# Patient Record
Sex: FEMALE | Race: BLACK OR AFRICAN AMERICAN | NOT HISPANIC OR LATINO | Employment: FULL TIME | URBAN - METROPOLITAN AREA
[De-identification: names, ages, dates, MRNs, and addresses within clinical notes are randomized per-mention and may not be internally consistent; named-entity substitution may affect disease eponyms.]

---

## 2023-08-22 ENCOUNTER — VIRTUAL VISIT (OUTPATIENT)
Dept: ENDOCRINOLOGY | Facility: CLINIC | Age: 33
End: 2023-08-22
Payer: MEDICAID

## 2023-08-22 VITALS — WEIGHT: 248 LBS | BODY MASS INDEX: 36.73 KG/M2 | HEIGHT: 69 IN

## 2023-08-22 DIAGNOSIS — E66.811 CLASS 1 OBESITY WITHOUT SERIOUS COMORBIDITY WITH BODY MASS INDEX (BMI) OF 31.0 TO 31.9 IN ADULT, UNSPECIFIED OBESITY TYPE: Primary | ICD-10-CM

## 2023-08-22 PROCEDURE — 99203 OFFICE O/P NEW LOW 30 MIN: CPT | Mod: VID | Performed by: INTERNAL MEDICINE

## 2023-08-22 ASSESSMENT — SLEEP AND FATIGUE QUESTIONNAIRES
HOW LIKELY ARE YOU TO NOD OFF OR FALL ASLEEP IN A CAR, WHILE STOPPED FOR A FEW MINUTES IN TRAFFIC: MODERATE CHANCE OF DOZING
HOW LIKELY ARE YOU TO NOD OFF OR FALL ASLEEP WHILE WATCHING TV: WOULD NEVER DOZE
HOW LIKELY ARE YOU TO NOD OFF OR FALL ASLEEP WHILE SITTING INACTIVE IN A PUBLIC PLACE: MODERATE CHANCE OF DOZING
HOW LIKELY ARE YOU TO NOD OFF OR FALL ASLEEP WHILE LYING DOWN TO REST IN THE AFTERNOON WHEN CIRCUMSTANCES PERMIT: MODERATE CHANCE OF DOZING
HOW LIKELY ARE YOU TO NOD OFF OR FALL ASLEEP WHILE SITTING AND READING: MODERATE CHANCE OF DOZING
HOW LIKELY ARE YOU TO NOD OFF OR FALL ASLEEP WHILE SITTING AND TALKING TO SOMEONE: MODERATE CHANCE OF DOZING
HOW LIKELY ARE YOU TO NOD OFF OR FALL ASLEEP WHILE SITTING QUIETLY AFTER LUNCH WITHOUT ALCOHOL: MODERATE CHANCE OF DOZING
HOW LIKELY ARE YOU TO NOD OFF OR FALL ASLEEP WHEN YOU ARE A PASSENGER IN A CAR FOR AN HOUR WITHOUT A BREAK: MODERATE CHANCE OF DOZING

## 2023-08-22 ASSESSMENT — PAIN SCALES - GENERAL: PAINLEVEL: MILD PAIN (3)

## 2023-08-22 NOTE — PATIENT INSTRUCTIONS
Start Wegovy 0.25 mg weekly x4 weeks;  increase to 0.5 mg weekly x4 weeks;  increase to 1.0 mg weekly for 4 weeks;  increase to 1.7 mg weekly for 4 weeks;  increase to 2.4 mg weekly thereafter    See Lauren Bloch, PharmD in 2 month(s)  See Dr.Tasma BOSWELL in 4 month(s)    If you have any questions, please do not hesitate to call Weight management clinic at [phone] or [phone].  If you need to fax, please fax to [phone].    Sincerely,    Jacquelyn Evans MD  Endocrinology

## 2023-08-22 NOTE — LETTER
"2023       RE: Diana Nelson  [address]  [facility] [address]     Dear Colleague,    Thank you for referring your patient, Diana Nelson, to the [facility] WEIGHT MANAGEMENT CLINIC [place] at [facility]. Please see a copy of my visit note below.        New Medical Weight Management Consult    PATIENT:  Diana Nelson  MRN:         [id number]  :         1990  OLVIN:         2023    Dear PCP,    I had the pleasure of seeing your patient, Diana Nelson. Full intake/assessment was done to determine barriers to weight loss success and develop a treatment plan. Diana Nelson is a 32 year old female interested in treatment of medical problems associated with excess weight. She has a height of 5' 9\", a weight of 248 lbs 0 oz, and the calculated Body mass index is 36.62 kg/m .    She has the following co-morbidities: depression and anxiety, left ankle pain    Weight history: hard time losing weight over the past several years although she reports eating healthy and stays active.   Was on Depot-Provera -- did not gain weight with it.    In 2018 - lost down 200 lbs -- with diet and exercise    Attempts: different diets but did not sustain weight loss    Eating habit: skip meals sometimes, cook and eat healthy, snack on some TrailMix.  Some traditional food or pasta. No fast food.   No pop, usually drinks ice coffee    Sleep: 6-7 hours per night  Exercise: regular exercise 3-4 times per week, strength training and cardio  Job: office job    She is not currently on Adderall.         2023     6:55 PM   --   I have the following health issues associated with obesity None of the above   I have the following symptoms associated with obesity Knee Pain    Depression    Lower Extremity Swelling    Back Pain    Groin Rash    Hip Pain    Irregular Menstral Cycle         2023     6:55 PM   Referring Provider   Please name the provider who referred " "you to Medical Weight Management  If you do not know, please answer \"I Don't Know\" I dont lnow           8/21/2023     6:55 PM   Weight History   How concerned are you about your weight? Very Concerned   I became overweight As a Child   The following factors have contributed to my weight gain Eating Wrong Types of Food    Eating Too Much    Stress   I have tried the following methods to lose weight Watching Portions or Calories    Exercise    Meal Replacements    Fasting   My lowest weight since age 19 was 200   My highest weight since age 19 was 260   The most weight I have ever lost was (lbs) 25   I have the following family history of obesity/being overweight I am the only one in my immediate family who is overweight   How has your weight changed over the last year? Gained   How many pounds? 30+           8/21/2023     6:55 PM   Diet Recall Review with Patient   If you do eat breakfast, what types of food do you eat? Oatmeal, eggs   If you do eat supper, what types of food do you typically eat? [place], veggies, pasta   If you do snack, what types of food do you typically eat? Trailmix   How many glasses of juice do you drink in a typical day? 1   How many of glasses of milk do you drink in a typical day? 0   How many 8oz glasses of sugar containing drinks such as Carlos-Aid/sweet tea do you drink in a day? 0   How many cans/bottles of sugar pop/soda/tea/sports drinks do you drink in a day? 0   How many cans/bottles of diet pop/soda/tea or sports drink do you drink in a day? 0   How often do you have a drink of alcohol? Never           8/21/2023     6:55 PM   Eating Habits   Generally, my meals include foods like these bread, pasta, rice, potatoes, corn, crackers, sweet dessert, pop, or juice Less Than Weekly   Generally, my meals include foods like these fried meats, brats, burgers, french fries, pizza, cheese, chips, or ice cream Never   Eat fast food (like McDonalds, Burger Shola, Taco Bell) Never   Eat at a " buffet or sit-down restaurant Never   Eat most of my meals in front of the TV or computer Never   Often skip meals, eat at random times, have no regular eating times Almost Everyday   Rarely sit down for a meal but snack or graze throughout A Few Times a Week   Eat extra snacks between meals Once a Week   Eat most of my food at the end of the day Once a Week   Eat in the middle of the night or wake up at night to eat Once a Week   Eat extra snacks to prevent or correct low blood sugar Never   Eat to prevent acid reflux or stomach pain Never   Worry about not having enough food to eat Never   I eat when I am depressed Never   I eat when I am stressed Less Than Weekly   I eat when I am bored Less Than Weekly   I eat when I am anxious Never   I eat when I am happy or as a reward Never   I feel hungry all the time even if I just have eaten Never   Feeling full is important to me Never   I finish all the food on my plate even if I am already full Never   I can't resist eating delicious food or walk past the good food/smell Never   I eat/snack without noticing that I am eating Less Than Weekly   I eat when I am preparing the meal Never   I eat more than usual when I see others eating Less Than Weekly   I have trouble not eating sweets, ice cream, cookies, or chips if they are around the house Never   I think about food all day Once a Week   What foods, if any, do you crave? None   Please list any other foods you crave?  Food           8/21/2023     6:55 PM   Amount of Food   I feel out of control when eating Never   I eat a large amount of food, like a loaf of bread, a box of cookies, a pint/quart of ice cream, all at once Never   I eat a large amount of food even when I am not hungry Never   I eat rapidly Never   I eat alone because I feel embarrassed and do not want others to see how much I have eaten Never   I eat until I am uncomfortably full Never   I feel bad, disgusted, or guilty after I overeat Never            8/21/2023     6:55 PM   Activity/Exercise History   How much of a typical 12 hour day do you spend sitting? Half the Day   How much of a typical 12 hour day do you spend lying down? Less Than Half the Day   How much of a typical day do you spend walking/standing? Less Than Half the Day   How many hours (not including work) do you spend on the TV/Video Games/Computer/Tablet/Phone? 1 Hour or Less   How many times a week are you active for the purpose of exercise? 2-3 Times a Week   What keeps you from being more active? Other   How many total minutes do you spend doing some activity for the purpose of exercising when you exercise? More Than 30 Minutes       PAST MEDICAL HISTORY:  Past Medical History:   Diagnosis Date    ADD (attention deficit disorder)     Depressive disorder     NO ACTIVE PROBLEMS            8/21/2023     6:55 PM   Work/Social History Reviewed With Patient   My employment status is Full-Time   My job is Admin   How much of your job is spent on the computer or phone? 75%   How many hours do you spend commuting to work daily? 15 -20Mins   What is your marital status? Single   Who do you live with? Family   Who does the food shopping? Me or my sister           8/21/2023     6:55 PM   Mental Health History Reviewed With Patient   Have you ever been physically or sexually abused? No   How often in the past 2 weeks have you felt little interest or pleasure in doing things? Not at all   Over the past 2 weeks how often have you felt down, depressed, or hopeless? Not at all           8/21/2023     6:55 PM   Sleep History Reviewed With Patient   How many hours do you sleep at night? 7       MEDICATIONS:   Current Outpatient Medications   Medication Sig Dispense Refill    Atomoxetine HCl (STRATTERA PO)       HYDROcodone-acetaminophen (NORCO) 5-325 MG per tablet Take 2 tablets by mouth every 4 hours as needed for moderate to severe pain 10 tablet 0    ondansetron (ZOFRAN ODT) 4 MG disintegrating  "tablet Take 1 tablet (4 mg) by mouth every 6 hours as needed for nausea 15 tablet 0    VENLAFAXINE HCL PO Take 75 mg by mouth         ALLERGIES:   Allergies   Allergen Reactions    Shellfish Allergy        PHYSICAL EXAM:  Ht 1.753 m (5' 9\")   Wt 112.5 kg (248 lb)   BMI 36.62 kg/m      Waist circumference:      Wt Readings from Last 4 Encounters:   08/22/23 112.5 kg (248 lb)   05/14/16 86.2 kg (190 lb)   07/16/14 94.3 kg (208 lb)   08/26/05 74.8 kg (165 lb) (96 %, Z= 1.80)*     * Growth percentiles are based on CDC (Girls, 2-20 Years) data.     A & O x 3  HEENT: NCAT, mucous membranes moist  Respirations unlabored  Location of obesity: Mixed Obesity      ASSESSMENT/PLAN:  Diana is a patient with mature onset obesity without significant element of familial/genetic influence and without current health consequences. She does not need aggressive weight loss plan.  Diana Nelson has a disorganized meal pattern.    Her problem is complicated by a hunger disorder and strong craving/reward pathways    Her ability to lose weight is impacted by current work life and lack of confidence.    PLAN:    Decrease eating out  Purge house of food triggers  No meal skipping  Calorie/low fat diet  Meal planning    Programmatic/Healthy Living  Ancillary testing:  N/A.  Food Plan:  N/A.  Activity Plan:  Exercise after meals.  Supplementary:   N/A.    Medication:  The patient will begin medication in pursuit of improved medical status as influenced by body weight. She will start Wegovy 0.25 mg weekly x4 weeks;  increase to 0.5 mg weekly x4 weeks;  increase to 1.0 mg weekly for 4 weeks;  increase to 1.7 mg weekly for 4 weeks;  increase to 2.4 mg weekly thereafter    There is a mutual understanding of the goals and risks of this therapy. The patient is in agreement. She is educated on dosage regimen and possible side effects.    Refer nutritionist    FOLLOW-UP:   See Lauren Bloch, PharmD in 2 month(s)  See Dr.Tasma BOSWELL in 4 " month(s)    Joined the call at 8/22/2023, 9:18:53 am.  Left the call at 8/22/2023, 9:34:30 am.  You were on the call for 15 minutes 36 seconds .    External notes/medical records independently reviewed, labs and imaging independently reviewed, medical management and tests to be discussed/communicated to patient.    Time: I spent 32 minutes spent on the date of the encounter preparing to see patient (including chart review and preparation), obtaining and or reviewing additional medical history, performing a physical exam and evaluation, documenting clinical information in the electronic health record, independently interpreting results, communicating results to the patient and coordinating care.    Sincerely,    Jacquelyn Evans MD

## 2023-08-22 NOTE — PROGRESS NOTES
Virtual Visit Details    Type of service:  Video Visit       Originating Location (pt. Location): Home    Distant Location (provider location):  Off-site  Platform used for Video Visit: Danilo

## 2023-08-22 NOTE — NURSING NOTE
Is the patient currently in the state of [place]? YES    Visit mode:VIDEO    If the visit is dropped, the patient can be reconnected by: VIDEO VISIT: Text to cell phone:   Telephone Information:   Mobile [phone]       Will anyone else be joining the visit? NO  (If patient encounters technical issues they should call [phone])    How would you like to obtain your AVS? MyChart    Are changes needed to the allergy or medication list? Pt declined allergy review and Pt stated no med changes    Reason for visit: Consult    Nanci WALTON

## 2023-08-23 ENCOUNTER — TELEPHONE (OUTPATIENT)
Dept: ENDOCRINOLOGY | Facility: CLINIC | Age: 33
End: 2023-08-23

## 2023-08-23 DIAGNOSIS — E66.811 CLASS 1 OBESITY WITHOUT SERIOUS COMORBIDITY WITH BODY MASS INDEX (BMI) OF 31.0 TO 31.9 IN ADULT, UNSPECIFIED OBESITY TYPE: Primary | ICD-10-CM

## 2023-08-23 RX ORDER — SEMAGLUTIDE 0.25 MG/.5ML
0.25 INJECTION, SOLUTION SUBCUTANEOUS WEEKLY
Qty: 2 ML | Refills: 0 | Status: SHIPPED | OUTPATIENT
Start: 2023-08-23

## 2023-08-23 NOTE — TELEPHONE ENCOUNTER
Left VM 8/23    Dr. Valladares sent me 2 patients that she would like to see an RD sooner than they have their appointments scheduled.This is a 30 minute spot for new M nutrition. Please assist.

## 2023-08-30 ENCOUNTER — TELEPHONE (OUTPATIENT)
Dept: ENDOCRINOLOGY | Facility: CLINIC | Age: 33
End: 2023-08-30
Payer: MEDICAID

## 2023-08-30 NOTE — TELEPHONE ENCOUNTER
BRIAN and sent mychart regarding the following:    Follow-up with Dr Valladares around 12/22/23  New appointment with Lauren Bloch around 10/22/23

## 2023-08-30 NOTE — LETTER
"2023    To: [place] Medicaid    RE: Diana Nelson  [address]  [place] [facility] [address]  : 1990  ID#: [id number]    MRN: [id number]    To Whom It May Concern,    I am writing on behalf of my patient, Diana Nelson to document the medical necessity of Wegovy for the treatment of obesity. This letter provides information about the patient's medical history and diagnosis and a statement summarizing my treatment rationale.     Summary of Patient History and Diagnosis  I had the pleasure of seeing your patient, Diana Nelson. Full intake/assessment was done to determine barriers to weight loss success and develop a treatment plan. Diana Nelson is a 32 year old female interested in treatment of medical problems associated with excess weight. She has a height of 5' 9\", a weight of 248 lbs 0 oz, and the calculated Body mass index is 36.62 kg/m .     She has the following co-morbidities: depression and anxiety, left ankle pain.       Treatment Rationale  Denial Rational:       Patient does not have any contraindications to Wegovy, including: pregnancy, no personal or family history of medullary thyroid carcinoma, no personal or family history of MENS2, not currently on any other GLP1 receptor agonist. Patient just established care with our comprehensive weight management clinic. She will be working on lifestyle modifications, including reduced calorie diet and increased physical activity/exercise. She is scheduled to meet one of our registered dietitians next month.       Duration  Up to 12 months.       Summary  In summary, Wegovy is medically necessary for this patient s medical condition. Please call my office at [phone] if I can provide you with any additional information to approve my request. I look forward to receiving your timely response and approval of this request.     Sincerely,    Jacquelyn Evans MD  "

## 2023-08-30 NOTE — TELEPHONE ENCOUNTER
Thank you,  Reynaldo Herrera, [facility]  [facility]  @~~~~~~

## 2023-09-01 NOTE — TELEPHONE ENCOUNTER
PA Initiation    Medication: WEGOVY 0.25 MG/0.5ML SC SOAJ  Insurance Company: [place] Medicaid ([facility]) - Phone [phone] Fax [phone]  Pharmacy Filling the Rx: [facility] - [address]  Filling Pharmacy Phone: [phone]  Filling Pharmacy Fax: [phone]  Start Date: 8/31/2023

## 2023-09-01 NOTE — TELEPHONE ENCOUNTER
PRIOR AUTHORIZATION DENIED    Medication: WEGOVY 0.25 MG/0.5ML SC SOAJ    Insurance Company: [place] Medicaid ([facility]) - Phone [phone] Fax [phone]    Denial Date: 9/1/2023    Denial Rational:       Appeal Information:

## 2023-09-05 NOTE — TELEPHONE ENCOUNTER
Medication Appeal Request    Please initiate an appeal for the requested medication: WEGOVY 0.25 MG/0.5ML SC SOAJ    Has a letter of medical necessity been completed in EPIC?   yes    Any additional lab values/information to include?     Would you like to include any research articles?               If yes please include the hyperlink(s) below or fax to    [phone] for Specialty/Retail               [phone] for Infusion/Clinic Administered.                Include the patients name and MRN on the fax cover sheet.

## 2023-09-05 NOTE — TELEPHONE ENCOUNTER
Medication Appeal Initiation    We have initiated an appeal for the requested medication:  Medication: WEGOVY 0.25 MG/0.5ML SC SOAJ  Appeal Start Date:  9/5/2023  Insurance Company: [place] Medicaid / [employer]  Insurance Phone: [phone]  Insurance Fax: [phone]  Comments:

## 2023-09-06 NOTE — TELEPHONE ENCOUNTER
MEDICATION APPEAL APPROVED    Medication: WEGOVY 0.25 MG/0.5ML SC SOAJ  Authorization Effective Date: 9/5/2023  Authorization Expiration Date: 9/30/2023  Approved Dose/Quantity: 4 pens per 28 days  Reference #:     Appeal Insurance Company: [place] Medicaid / [employer]  Expected CoPay:       CoPay Card Available:    Financial Assistance Needed:   Which Pharmacy is filling the prescription: [facility] [place] - [address]  Patient Notified: Yes    Only approved for one month. Will need to re-do in October once new insurance is available.

## 2023-09-18 ENCOUNTER — APPOINTMENT (OUTPATIENT)
Dept: GENERAL RADIOLOGY | Facility: CLINIC | Age: 33
End: 2023-09-18
Attending: EMERGENCY MEDICINE
Payer: MEDICAID

## 2023-09-18 ENCOUNTER — HOSPITAL ENCOUNTER (EMERGENCY)
Facility: CLINIC | Age: 33
Discharge: HOME | End: 2023-09-18
Admitting: EMERGENCY MEDICINE
Payer: MEDICAID

## 2023-09-18 VITALS
OXYGEN SATURATION: 100 % | HEART RATE: 77 BPM | DIASTOLIC BLOOD PRESSURE: 91 MMHG | SYSTOLIC BLOOD PRESSURE: 130 MMHG | RESPIRATION RATE: 18 BRPM | TEMPERATURE: 98 F

## 2023-09-18 PROCEDURE — 99283 EMERGENCY DEPT VISIT LOW MDM: CPT

## 2023-09-18 PROCEDURE — 99281 EMR DPT VST MAYX REQ PHY/QHP: CPT

## 2023-09-18 PROCEDURE — 73610 X-RAY EXAM OF ANKLE: CPT | Mod: LT

## 2023-09-19 NOTE — ED TRIAGE NOTES
Pt comes in for L ankle pain. Pt had fall yesterday after tripping on carpet. No swelling seen. Pt is limping

## 2023-10-02 ENCOUNTER — TELEPHONE (OUTPATIENT)
Dept: ENDOCRINOLOGY | Facility: CLINIC | Age: 33
End: 2023-10-02

## 2023-10-31 ENCOUNTER — TELEPHONE (OUTPATIENT)
Dept: ENDOCRINOLOGY | Facility: CLINIC | Age: 33
End: 2023-10-31

## 2023-10-31 DIAGNOSIS — E66.811 CLASS 1 OBESITY WITHOUT SERIOUS COMORBIDITY WITH BODY MASS INDEX (BMI) OF 31.0 TO 31.9 IN ADULT, UNSPECIFIED OBESITY TYPE: Primary | ICD-10-CM

## 2023-10-31 NOTE — TELEPHONE ENCOUNTER
Talked with patient and she states her 10/31 appointment with Danita Baron should have been cancelled about 1 week ago (per discussions via mychart).    She has yet to start any medication (due to backorder) and will call to schedule with dietitian in future.    Her insurance is changing as of tomorrow and she will call to update.

## 2023-10-31 NOTE — TELEPHONE ENCOUNTER
Patient needs to be rescheduled for their virtual visit due to Reason for Reschedule: Patient Request    Appointment mode: Video  Provider: Danita Baron

## 2023-11-02 DIAGNOSIS — E66.811 CLASS 1 OBESITY WITHOUT SERIOUS COMORBIDITY WITH BODY MASS INDEX (BMI) OF 31.0 TO 31.9 IN ADULT, UNSPECIFIED OBESITY TYPE: Primary | ICD-10-CM

## 2023-11-02 NOTE — TELEPHONE ENCOUNTER
Pt is requesting new rx for    Pen needles 1gx 6mm- use with saxenda    Did not see on active med list please verify and send new rx    [facility] spec/mail pharmacy  [phone]

## 2023-11-17 DIAGNOSIS — E66.811 CLASS 1 OBESITY WITHOUT SERIOUS COMORBIDITY WITH BODY MASS INDEX (BMI) OF 31.0 TO 31.9 IN ADULT, UNSPECIFIED OBESITY TYPE: Primary | ICD-10-CM

## 2023-11-17 RX ORDER — PHENTERMINE HYDROCHLORIDE 15 MG/1
15 CAPSULE ORAL EVERY MORNING
Qty: 30 CAPSULE | Refills: 2 | Status: SHIPPED | OUTPATIENT
Start: 2023-11-17

## 2023-11-30 ENCOUNTER — HOSPITAL ENCOUNTER (EMERGENCY)
Facility: CLINIC | Age: 33
Discharge: HOME | End: 2023-11-30
Admitting: EMERGENCY MEDICINE

## 2023-11-30 VITALS
HEART RATE: 65 BPM | SYSTOLIC BLOOD PRESSURE: 148 MMHG | RESPIRATION RATE: 16 BRPM | TEMPERATURE: 98.3 F | OXYGEN SATURATION: 98 % | DIASTOLIC BLOOD PRESSURE: 76 MMHG

## 2023-11-30 LAB
FLUAV RNA SPEC QL NAA+PROBE: NEGATIVE
FLUBV RNA RESP QL NAA+PROBE: NEGATIVE
RSV RNA SPEC NAA+PROBE: NEGATIVE
SARS-COV-2 RNA RESP QL NAA+PROBE: NEGATIVE

## 2023-11-30 PROCEDURE — 87637 SARSCOV2&INF A&B&RSV AMP PRB: CPT | Performed by: EMERGENCY MEDICINE

## 2023-11-30 PROCEDURE — 99281 EMR DPT VST MAYX REQ PHY/QHP: CPT

## 2024-09-01 ENCOUNTER — HEALTH MAINTENANCE LETTER (OUTPATIENT)
Age: 34
End: 2024-09-01

## 2025-01-07 ENCOUNTER — VIRTUAL VISIT (OUTPATIENT)
Dept: ENDOCRINOLOGY | Facility: CLINIC | Age: 35
End: 2025-01-07
Payer: COMMERCIAL

## 2025-01-07 VITALS — BODY MASS INDEX: 36.62 KG/M2 | HEIGHT: 69 IN

## 2025-01-07 DIAGNOSIS — E66.811 CLASS 1 OBESITY WITHOUT SERIOUS COMORBIDITY WITH BODY MASS INDEX (BMI) OF 31.0 TO 31.9 IN ADULT, UNSPECIFIED OBESITY TYPE: Primary | ICD-10-CM

## 2025-01-07 PROCEDURE — 98006 SYNCH AUDIO-VIDEO EST MOD 30: CPT | Performed by: INTERNAL MEDICINE

## 2025-01-07 PROCEDURE — G2211 COMPLEX E/M VISIT ADD ON: HCPCS | Performed by: INTERNAL MEDICINE

## 2025-01-07 ASSESSMENT — PAIN SCALES - GENERAL: PAINLEVEL_OUTOF10: MODERATE PAIN (5)

## 2025-01-07 NOTE — NURSING NOTE
Current patient location: [address]   [facility] [address]    Is the patient currently in the state of [place]? YES    Visit mode:VIDEO    If the visit is dropped, the patient can be reconnected by:VIDEO VISIT: Send to e-mail at: [email or web address]    Will anyone else be joining the visit? NO  (If patient encounters technical issues they should call [phone])    Are changes needed to the allergy or medication list? No    Are refills needed on medications prescribed by this physician? NO    Rooming Documentation:  Questionnaire(s) completed    Reason for visit: RECHECK    Pt states 5/10 ankle pain chronic with previous surgeries.  Pt states no weight available within last week.    Isaias CLOUDF

## 2025-01-07 NOTE — PATIENT INSTRUCTIONS
Schedule labs please    Start Zepbound 2.5 mg weekly for 4 weeks  increase to 5.0 mg weekly x4 weeks;  increase to 7.5 mg weekly x4 weeks;  increase to 10.0 mg weekly for 4 weeks;  increase to 12.5 mg weekly for 4 weeks;  increase to 15.0 mg weekly thereafter    See MTM PharmD in 1 month(s)  See MD or PA in 4 month(s)    If you have any questions, please do not hesitate to call Weight management clinic at [phone] or [phone].  If you need to fax, please fax to [phone].    Sincerely,    Jacquelyn Evans MD  Endocrinology

## 2025-01-07 NOTE — LETTER
2025       RE: Diana Nelson  [address]  [place] [address]     Dear Colleague,    Thank you for referring your patient, Diana Nelson, to the [facility] WEIGHT MANAGEMENT CLINIC [place] at [facility]. Please see a copy of my visit note below.    Virtual Visit Details    Type of service:  Video Visit     Originating Location (pt. Location): Home  {PROVIDER LOCATION On-site should be selected for visits conducted from your clinic location or adjoining [facility] hospital, academic office, or other nearby [facility] building. Off-site should be selected for all other provider locations, including home:[id number]}  Distant Location (provider location):  Off-site  Platform used for Video Visit: [facility] Medical Weight Management Note     Diana Nelson  MRN:  [id number]  :  1990  OLVIN:  2025    Dear LISA PAPPAS PA-C,    I had the pleasure of seeing your patient Diana Nelson. She is a 34 year old female who I am continuing to see for treatment of obesity related to: depression and anxiety, left ankle pain         2023     6:55 PM   --   I have the following health issues associated with obesity None of the above   I have the following symptoms associated with obesity Knee Pain    Depression    Lower Extremity Swelling    Back Pain    Groin Rash    Hip Pain    Irregular Menstral Cycle     Weight history: hard time losing weight over the past several years although she reports eating healthy and stays active.   Was on Depot-Provera -- did not gain weight with it.  In 2018 - lost down 200 lbs -- with diet and exercise  Attempts: different diets but did not sustain weight loss    INTERVAL HISTORY:  Initial visit . Could not get Wegovy approval.   Tried phentermine instead but did not help. Feel jittery.  She has not been on medication or followed with us for more than a year.    She continues to work on diet and exercise. She  "feels that she eats healthy.  She attends the gym 5 days per week.     CURRENT WEIGHT:   0 lbs 0 oz    Initial Weight (lbs): 237 lbs  Last Visits Weight: 112.5 kg (248 lb)              1/7/2025     2:07 PM   Changes and Difficulties   With regards to my diet, I am still struggling with: Loosing weight, i ve been working out and eating healthy   I have made the following changes to my activity/exercise since my last visit: Lifting, cardio       VITALS:  Ht 1.753 m (5' 9\")   BMI 36.62 kg/m      MEDICATIONS:   Current Outpatient Medications   Medication Sig Dispense Refill     Atomoxetine HCl (STRATTERA PO)        HYDROcodone-acetaminophen (NORCO) 5-325 MG per tablet Take 2 tablets by mouth every 4 hours as needed for moderate to severe pain 10 tablet 0     insulin pen needle (31G X 5 MM) 31G X 5 MM miscellaneous Use 1 pen needles daily or as directed. 100 each 0     ondansetron (ZOFRAN ODT) 4 MG disintegrating tablet Take 1 tablet (4 mg) by mouth every 6 hours as needed for nausea 15 tablet 0     phentermine (ADIPEX-P) 15 MG capsule Take 1 capsule (15 mg) by mouth every morning 30 capsule 2     Semaglutide-Weight Management (WEGOVY) 0.25 MG/0.5ML pen Inject 0.25 mg Subcutaneous once a week 2 mL 0     Semaglutide-Weight Management (WEGOVY) 0.5 MG/0.5ML pen Inject 0.5 mg Subcutaneous once a week To be used after finishing 0.25 mg pen 2 mL 0     Semaglutide-Weight Management (WEGOVY) 1 MG/0.5ML pen Inject 1 mg Subcutaneous once a week To be used after finishing 0.5 mg pen 2 mL 0     VENLAFAXINE HCL PO Take 75 mg by mouth             1/7/2025     2:07 PM   Weight Loss Medication History Reviewed With Patient   Which weight loss medications are you currently taking on a regular basis? None   If you are not taking a weight loss medication that was prescribed to you, please indicate why: The cost is too much for me    My insurance does not cover the cost   Are you having any side effects from the weight loss medication that " we have prescribed you? No       ASSESSMENT:   Diana Nelson is a 34 year old female who I am continuing to see for treatment of obesity related to: depression and anxiety, left ankle pain     GLP-1 was not covered in the past so phentermine was prescribed.  However, she could not tolerate due to jitteriness.  Would like to try injectable medication.  Eat healthy and exercise regularly    PLAN:   Start Zepbound 2.5 mg weekly for 4 weeks  increase to 5.0 mg weekly x4 weeks;  increase to 7.5 mg weekly x4 weeks;  increase to 10.0 mg weekly for 4 weeks;  increase to 12.5 mg weekly for 4 weeks;  increase to 15.0 mg weekly thereafter    If not covered, she is interested in compounded semaglutide    Continue to work on diet and exercise  Lab this year    FOLLOW-UP:    See MTM PharmD in 1 month(s)  See MD or PA in 4 month(s).    Joined the call at 1/7/2025, 2:26:59 pm.  Left the call at 1/7/2025, 2:34:18 pm.  You were on the call for 7 minutes 18 seconds .  Sincerely,    Jacquelyn Evans MD      Again, thank you for allowing me to participate in the care of your patient.      Sincerely,    Jacquelyn Evans MD

## 2025-01-07 NOTE — PROGRESS NOTES
"    Return Medical Weight Management Note     Diana Nelson  MRN:  [id number]  :  1990  OLVIN:  2025    Dear LISA PAPPAS PA-C,    I had the pleasure of seeing your patient Diana Nelson. She is a 34 year old female who I am continuing to see for treatment of obesity related to: depression and anxiety, left ankle pain         2023     6:55 PM   --   I have the following health issues associated with obesity None of the above   I have the following symptoms associated with obesity Knee Pain    Depression    Lower Extremity Swelling    Back Pain    Groin Rash    Hip Pain    Irregular Menstral Cycle     Weight history: hard time losing weight over the past several years although she reports eating healthy and stays active.   Was on Depot-Provera -- did not gain weight with it.  In 2018 - lost down 200 lbs -- with diet and exercise  Attempts: different diets but did not sustain weight loss    INTERVAL HISTORY:  Initial visit . Could not get Wegovy approval.   Tried phentermine instead but did not help. Feel jittery.  She has not been on medication or followed with us for more than a year.    She continues to work on diet and exercise. She feels that she eats healthy.  She attends the gym 5 days per week.     CURRENT WEIGHT:   0 lbs 0 oz    Initial Weight (lbs): 237 lbs  Last Visits Weight: 112.5 kg (248 lb)              2025     2:07 PM   Changes and Difficulties   With regards to my diet, I am still struggling with: Loosing weight, i ve been working out and eating healthy   I have made the following changes to my activity/exercise since my last visit: Lifting, cardio       VITALS:  Ht 1.753 m (5' 9\")   BMI 36.62 kg/m      MEDICATIONS:   Current Outpatient Medications   Medication Sig Dispense Refill    Atomoxetine HCl (STRATTERA PO)       HYDROcodone-acetaminophen (NORCO) 5-325 MG per tablet Take 2 tablets by mouth every 4 hours as needed for moderate to severe pain 10 tablet 0    insulin pen " Please schedule fasting labs within the next 2 months   needle (31G X 5 MM) 31G X 5 MM miscellaneous Use 1 pen needles daily or as directed. 100 each 0    ondansetron (ZOFRAN ODT) 4 MG disintegrating tablet Take 1 tablet (4 mg) by mouth every 6 hours as needed for nausea 15 tablet 0    phentermine (ADIPEX-P) 15 MG capsule Take 1 capsule (15 mg) by mouth every morning 30 capsule 2    Semaglutide-Weight Management (WEGOVY) 0.25 MG/0.5ML pen Inject 0.25 mg Subcutaneous once a week 2 mL 0    Semaglutide-Weight Management (WEGOVY) 0.5 MG/0.5ML pen Inject 0.5 mg Subcutaneous once a week To be used after finishing 0.25 mg pen 2 mL 0    Semaglutide-Weight Management (WEGOVY) 1 MG/0.5ML pen Inject 1 mg Subcutaneous once a week To be used after finishing 0.5 mg pen 2 mL 0    VENLAFAXINE HCL PO Take 75 mg by mouth             1/7/2025     2:07 PM   Weight Loss Medication History Reviewed With Patient   Which weight loss medications are you currently taking on a regular basis? None   If you are not taking a weight loss medication that was prescribed to you, please indicate why: The cost is too much for me    My insurance does not cover the cost   Are you having any side effects from the weight loss medication that we have prescribed you? No       ASSESSMENT:   Diana Nelson is a 34 year old female who I am continuing to see for treatment of obesity related to: depression and anxiety, left ankle pain     GLP-1 was not covered in the past so phentermine was prescribed.  However, she could not tolerate due to jitteriness.  Would like to try injectable medication.  Eat healthy and exercise regularly    PLAN:   Start Zepbound 2.5 mg weekly for 4 weeks  increase to 5.0 mg weekly x4 weeks;  increase to 7.5 mg weekly x4 weeks;  increase to 10.0 mg weekly for 4 weeks;  increase to 12.5 mg weekly for 4 weeks;  increase to 15.0 mg weekly thereafter    If not covered, she is interested in compounded semaglutide    Continue to work on diet and exercise  Lab this year    FOLLOW-UP:    See MTM  PharmD in 1 month(s)  See MD or PA in 4 month(s).    Joined the call at 1/7/2025, 2:26:59 pm.  Left the call at 1/7/2025, 2:34:18 pm.  You were on the call for 7 minutes 18 seconds .  Sincerely,    Jacquelyn Evans MD

## 2025-01-15 ENCOUNTER — TELEPHONE (OUTPATIENT)
Dept: ENDOCRINOLOGY | Facility: CLINIC | Age: 35
End: 2025-01-15
Payer: COMMERCIAL

## 2025-01-15 NOTE — TELEPHONE ENCOUNTER
See MTM PharmD in 1 month(s)  See MD or PA in 4 month(s)     Patient confirmed scheduled appointment:     Date: 3/4/25  Time: 7:30 AM  Visit type: MTM  Visit mode: Virtual Visit  Provider:  Jasmina Chandra  [place] [facility] [place] 6/24/25  Time: 12:45 PM  Visit type: Return Weight Management  Visit mode: Virtual Visit  Provider:  Dr. Jacquelyn Aragon  [place] [facility] [place]

## 2025-01-28 DIAGNOSIS — E66.811 CLASS 1 OBESITY WITHOUT SERIOUS COMORBIDITY WITH BODY MASS INDEX (BMI) OF 31.0 TO 31.9 IN ADULT, UNSPECIFIED OBESITY TYPE: Primary | ICD-10-CM

## 2025-02-17 DIAGNOSIS — R11.0 NAUSEA: Primary | ICD-10-CM

## 2025-02-17 RX ORDER — ONDANSETRON 4 MG/1
4 TABLET, FILM COATED ORAL EVERY 8 HOURS PRN
Qty: 30 TABLET | Refills: 0 | Status: SHIPPED | OUTPATIENT
Start: 2025-02-17

## 2025-02-26 ENCOUNTER — DOCUMENTATION ONLY (OUTPATIENT)
Dept: ENDOCRINOLOGY | Facility: CLINIC | Age: 35
End: 2025-02-26
Payer: COMMERCIAL

## 2025-02-26 DIAGNOSIS — E66.811 CLASS 1 OBESITY WITHOUT SERIOUS COMORBIDITY WITH BODY MASS INDEX (BMI) OF 31.0 TO 31.9 IN ADULT, UNSPECIFIED OBESITY TYPE: Primary | ICD-10-CM

## 2025-02-26 NOTE — PROGRESS NOTES
Clinical Pharmacy Note    MTM referral placed due to [facility] Medication Therapy Management (MTM) practice shift. CPA with Dr. Jacquelyn Evans .      Jasmina Chandra, Pharm D., MPH    Medication Therapy Management Pharmacist   [facility]

## 2025-03-04 ENCOUNTER — VIRTUAL VISIT (OUTPATIENT)
Dept: PHARMACY | Facility: CLINIC | Age: 35
End: 2025-03-04
Attending: INTERNAL MEDICINE
Payer: COMMERCIAL

## 2025-03-04 VITALS — BODY MASS INDEX: 37.89 KG/M2 | HEIGHT: 69 IN | WEIGHT: 255.8 LBS

## 2025-03-04 DIAGNOSIS — E66.811 CLASS 1 OBESITY WITHOUT SERIOUS COMORBIDITY WITH BODY MASS INDEX (BMI) OF 31.0 TO 31.9 IN ADULT, UNSPECIFIED OBESITY TYPE: Primary | ICD-10-CM

## 2025-03-04 DIAGNOSIS — F90.9 ATTENTION DEFICIT HYPERACTIVITY DISORDER (ADHD), UNSPECIFIED ADHD TYPE: ICD-10-CM

## 2025-03-04 DIAGNOSIS — E55.9 VITAMIN D DEFICIENCY: ICD-10-CM

## 2025-03-04 RX ORDER — CHOLECALCIFEROL (VITAMIN D3) 50 MCG
1 TABLET ORAL DAILY
Qty: 90 TABLET | Refills: 3 | Status: SHIPPED | OUTPATIENT
Start: 2025-03-04

## 2025-03-04 RX ORDER — DEXTROAMPHETAMINE SACCHARATE, AMPHETAMINE ASPARTATE MONOHYDRATE, DEXTROAMPHETAMINE SULFATE AND AMPHETAMINE SULFATE 2.5; 2.5; 2.5; 2.5 MG/1; MG/1; MG/1; MG/1
10 CAPSULE, EXTENDED RELEASE ORAL DAILY PRN
COMMUNITY
Start: 2025-01-27

## 2025-03-04 RX ORDER — ACETAMINOPHEN 160 MG
2000 TABLET,DISINTEGRATING ORAL DAILY
COMMUNITY
Start: 2024-11-25 | End: 2025-03-04

## 2025-03-04 ASSESSMENT — PAIN SCALES - GENERAL: PAINLEVEL_OUTOF10: NO PAIN (0)

## 2025-03-04 NOTE — NURSING NOTE
Current patient location: [address] [facility] [address]  [facility] [address]  Is the patient currently in the state of [place]? YES    Visit mode: CONVERT TO TELEPHONE    If the visit is dropped, the patient can be reconnected by:TELEPHONE VISIT: Phone number:   Telephone Information:   Mobile [phone]       Will anyone else be joining the visit? NO  (If patient encounters technical issues they should call [phone]    Are changes needed to the allergy or medication list? No    Are refills needed on medications prescribed by this physician? NO    Rooming Documentation:  Questionnaire(s) not pre-assigned    Reason for visit: Medication Therapy Management    Isaias WALTON

## 2025-03-04 NOTE — PATIENT INSTRUCTIONS
Recommendations from MTM Pharmacist visit:                                                    MTM (medication therapy management) is a service provided by a clinical pharmacist designed to help you get the most of out of your medicines.  You may be sent a phone or email survey evaluating today's visit.  Please provide feedback you have for the service he received today if you are able.      Continue compounded semaglutide 0.25 mg (5 units) weekly until after Ramadan. After Ramadan, increase to 0.5 mg (10 units) on the following day of injection.     You will stay on 10 units compounded semaglutide weekly until follow up with Jasmina Chandra PharmD where we will discuss likely transitioning to compounded tirzepatide from [facility].      To help with tolerability and effectiveness of semaglutide:  Eat 3 meals with protein focus daily to help with nausea. If you forget to eat, you may feel nausea as a hunger cue.  Focus on getting protein in first with each meal and snack.   A good starting goal is 60 g protein daily (track this, especially if at weight loss plateau). Once you consistently are getting 60g daily, try getting 90 g protein daily.  Drink plenty of water - goal 64 oz throughout the day  You may try Metamucil, Benefiber, or Citrucel to help feel more full (less nausea) and have softer, more consistent bowel movements.  To optimize weight management - work on incorporating resistance training/weight lifting to build muscle and improve overall metabolism of adipose tissue.    Restart vitamin d 2000 international units once daily (sent prescription to [facility] today)    Follow-up:   Appointments in Next Year      Apr 22, 2025 8:30 AM  Pharmacist Visit with Jasmina Chandra RPH  [facility] MTM ([facility] ) [phone]     Jun 24, 2025 12:45 PM  (Arrive by 12:30 PM)  Return Weight Management Visit with Jacquelyn Evans MD  [facility]  "[facility] [place] ([facility] ) [phone]                It was great speaking with you today.  I value your experience and would be very thankful for your time in providing feedback in our clinic survey. In the next few days, you may receive an email or text message from [facility] [employer] with a link to a survey related to your  clinical pharmacist.\"     To schedule another MTM appointment, please call the clinic directly ([facility] Phone Number: [phone] (schedules for [facility] and [facility] - providers, dietitians, health coaches) or you may call the MTM scheduling line at [phone] or toll-free at [phone].     My Clinical Pharmacist's contact information:                                                      Please feel free to contact me with any questions or concerns you have.      Jasmina Chandra, Pharm D., MPH    Medication Therapy Management Pharmacist   [facility]          Meal Replacement Shake Options:   *Protein Shake Criteria: no more than 210 Calories, at least 20 grams of protein, and less than 10 grams of sugar   Premier Protein (160 Calories, 30 g protein)  Slim Fast Advanced Nutrition (180 Calories, 20 g protein)  Muscle Milk, lactose-free, 17 oz bottle (210 Calories, 30 g protein)  Integrated Supplements, no artificial sugars (110 Calories, 20 g protein)  Boost/Ensure Max (160 calories, 30 gm protein)   Fairlife Protein Shakes (160-230 calories, 26-42 gm protein)  Aldi's Elevation Protein Powder (180 calories, 30 gm protein)   Orgain Protein Shakes (130-160 calories, 20-26 gm protein)     Meal Replacement Bar Options:  Quest Protein Bars (190 Calories, 20 g protein)  Built Bar (170 Calories, 15-20 g protein)  One Protein Bar (210 calories, 20 g protein)  [place] Signature Protein Bar ([employer]co) (190 Calories, 21 g protein)  Pure Protein " Bars (180 Calories, 21 g protein)    Low Calorie Frozen Meal:  Healthy Choice Power Bowls  Lean Katty Gross      ---------------------------------------------------------------  Tips to Increase the Protein in Your Diet  You may need more protein in your diet to help you heal from an illness, surgery or wound. Extra protein can also help you gain weight. Here are some ideas for adding high-protein foods to your meals.  Meat and fish  Add chopped cooked meat to vegetables, salads, casseroles, soups, sauces and biscuit dough.  Use in omelets, soufflés, quiches, sandwich fillings and chicken or turkey stuffing.  Wrap in pie crust or biscuit dough to make a turnover.  Add to stuffed baked potatoes.  Make a dip with diced meat or flaked fish mixed with sour cream and spices.  Chopped, hard-cooked eggs  Add to salads.  Use for snacks and sandwich filling.  High-protein milk  To make high-protein milk, mix 1 quart whole milk with 1 cup powdered milk.  Add to cream soups, mashed potatoes, scrambled eggs, cereals and dried eggnog mix.  Use as an ingredient in puddings, custards, hot chocolate, milk shakes and pancakes.  Powdered milk  If you don't have any high-protein milk on hand, you can use powdered milk. Add 3 tablespoons to:  gravies, sauces, cream soups, mayonnaise  casseroles, meat patties, meatloaf, tuna salad, deviled ham  scalloped or mashed potatoes, creamed spinach  scrambled eggs, egg salad  cereals  yogurt, milk drinks, ice cream, frozen desserts, puddings, custards.  Add 4 to 6 tablespoons powdered milk to make:  cream sauces  breads, muffins, pancakes, waffles, cookies, cakes  cream pies, frostings, cake fillings  fruit cobblers, bread or rice pudding, gelatin desserts.  For high-protein eggnog, add 3 to 6 tablespoons powdered milk to prepared eggnog.  Hard or soft cheese  Melt on sandwiches, breads, tortillas, hamburgers, hot dogs, other meats, vegetables, eggs and pies.  Grate  into soups, chili, sauces, casseroles, vegetables, potatoes, rice, noodles or meatloaf.  Eat with toast or crackers, or melt for scot dip.  Cottage cheese or ricotta cheese  Mix with or scoop on top of fruits and vegetables.  Add to casseroles, lasagna, spaghetti, noodles and egg dishes (omelets, scrambled eggs, soufflés).  Use in gelatin, pudding-type desserts, cheesecake and pancake batter.  Use to stuff crepes, pasta shells or manicotti.  Fruit yogurt  Blend with fruits for a fruit smoothie.  Use as a dip for fruits and vegetables.  Scoop on top of pancakes or waffles.  Tofu  Blend silken tofu with fruits and juices for a smoothie.  Add chunks of firm tofu to soups and stews, or crumble into meatloaf.  Blend dried onion soup mix into soft or silken tofu for dip.  Use pureed silken tofu for part of the mayonnaise, sour cream, cream cheese or ricotta cheese called for in recipes.  Beans  Use cooked beans or peas in soups, casseroles, pasta, tacos and burritos.  Nuts and seeds  Note: For children under 3, discuss with the child's care team.  Use in casseroles, breads, muffins, pancakes, cookies and waffles.  Sprinkle on fruits, cereals, ice cream, yogurt, vegetables and salads.  Mix with raisins, dried fruits and chocolate chips for a snack.  Nut butters  Note: For children under 3, discuss with the child's care team.  Spread on sandwiches, toast, muffins, crackers, waffles, pancakes and fruit slices.  Use as a dip for raw vegetables.  Blend with milk drinks, or swirl through ice cream, yogurt or hot cereal.  Nutrition supplements (nutrition bars, drinks and powders)  Add powders to milk drinks and desserts.  Mix with ice cream, milk and fruit for a high-protein milk shake.    For informational purposes only. Not to replace the advice of your health care provider. Clinically reviewed by Monica Wihttington, RD, LD, and the Clinical Nutrition Service Line. Copyright   2005 [facility]. All rights  "reserved. [employer] 440894 - REV 04/24.      -----------------------------------------------------------------------------------------------------------------  Learning About High-Protein Foods  What foods are high in protein?     The foods you eat contain nutrients, such as vitamins and minerals. Protein is a nutrient. Your body needs the right amount to stay healthy and work as it should. You can use the list below to help you make choices about which foods to eat.  Here are some examples of foods that are high in protein.  Dairy and dairy alternatives  Cheese  Milk  Soy milk  Yogurt (especially Greek)  Meat  Beef  Chicken  Ham  Lamb  Lunch meat  Pork  Sausage  Turkey  Other protein foods  Hemp seeds!  Beans (black, garbanzo, kidney, lima)  Eggs  Hummus  Lentils  Nuts  Peanut butter and other nut butters  Peas  Soybeans  Tofu  Veggie or soy jennifer (Check the nutrition label for the amount of protein in each serving.)  Seafood  Anchovies  Cod  Crab  Halibut  [place]  Sardines  Shrimp  [place]  [place]  Tuna  Protein supplements  Bars (Check the nutrition label for the amount of protein in each serving.)  Drinks  Powders  Work with your doctor to find out how much of this nutrient you need. Depending on your health, you may need more or less of it in your diet.  Where can you learn more?  Go to https://www.PingCo.com.net/patiented  Enter P335 in the search box to learn more about \"Learning About High-Protein Foods.\"  Current as of: September 20, 2023               Content Version: 14.0    9204-7090 [employer].   Care instructions adapted under license by your healthcare professional. If you have questions about a medical condition or this instruction, always ask your healthcare professional. [employer] disclaims any warranty or liability for your use of this information.         "

## 2025-03-04 NOTE — PROGRESS NOTES
Medication Therapy Management (MTM) Encounter    ASSESSMENT:                            Medication Adherence/Access: discussed changes in compounded semaglutide -- access given Wegovy no longer on shortage, won't be able to receive compounded semaglutide after April 22. Patient likely to transition to compounded tirzepatide as anticipated to be available from [facility] at that transition time. Estimated cost $300/month affordable for patient at this time.     Weight management :   Patient would benefit from continuing pharmacotherapy for weight management. Given tolerating well, class II obesity, recommend optimizing GLP1/GIP therapy as data to support most significant weight loss and patient has no contraindications. Patient also realizing benefit from reduction in food noise and increased satiety. Education provided on continued dietary and behavioral modifications - suspect patient not meeting nutritional goals and leading to weight gain; patient to focus on lifestyle modifications to optimize dose. Given lower food intake during Ramadan, recommend to maintain current dose compounded semaglutide until after Ramadan, then increase to 0.5mg weekly (given weight gain since starting semaglutide) when eating patterns more consistent to prevent side effects.          ADHD/ anxiety/depression : stable       Supplements education provided on importance of vitamin d supplementation. Patient agreed. Refill vitamin d 2000 international units sent to [facility].    PLAN:                            Continue compounded semaglutide 0.25 mg (5 units) weekly until after Ramadan. After Ramadan, increase to 0.5 mg (10 units) on the following day of injection.     You will stay on 10 units compounded semaglutide weekly until follow up with Jasmina Chandra, PharmD where we will discuss likely transitioning to compounded tirzepatide from [facility].      To help with tolerability and effectiveness of  semaglutide:  Eat 3 meals with protein focus daily to help with nausea. If you forget to eat, you may feel nausea as a hunger cue.  Focus on getting protein in first with each meal and snack.   A good starting goal is 60 g protein daily (track this, especially if at weight loss plateau). Once you consistently are getting 60g daily, try getting 90 g protein daily.  Drink plenty of water - goal 64 oz throughout the day  You may try Metamucil, Benefiber, or Citrucel to help feel more full (less nausea) and have softer, more consistent bowel movements.  To optimize weight management - work on incorporating resistance training/weight lifting to build muscle and improve overall metabolism of adipose tissue.    Restart vitamin d 2000 international units once daily (sent prescription to [facility] today)    Follow-up:   Appointments in Next Year      Apr 22, 2025 8:30 AM  Pharmacist Visit with Jasmina Chandra RPH  [facility] ([facility] ) [phone]     Jun 24, 2025 12:45 PM  (Arrive by 12:30 PM)  Return Weight Management Visit with Jacquelyn Evans MD  [facility] [place] ([facility] ) [phone]            SUBJECTIVE/OBJECTIVE:                          Diana Nelson is a 34 year old female seen for an initial visit. She was referred to me from Dr. Jacquelyn Evans .      Reason for visit: Medication Therapy Management weight management .    Allergies/ADRs: Reviewed in chart  Past Medical History: Reviewed in chart  Tobacco: She reports that she has never smoked. She has never used smokeless tobacco.  Alcohol: not  using    Medication Adherence/Access: no issues reported.  Compounded semaglutide affordable at this time.  Ramadan currently -so taking it easy    Weight Management   Compounded semaglutide  0.25 mg (5 units) weekly x 4 weeks     Return Medical Weight Management Visit  "1/7/25 with Dr. Jacquelyn Evans    Patient reports no current medication side effects. Somewhat frustrated that seeing weight gain with semaglutide, but notes Ramadan may be a bit different for weight management for her given only eating after sundown. Notes lower portions which is helpful. Still having some hunger on/off that is difficult to manage.    Nutrition/Eating Habits: limiting portions   Before fasting Ramadan: oatmeal, small meals and snacks through the day  Meal preps: turkey, steak, chicken, eggs vegetables during the day    Ramadan: fasting during the day - when breaking fast will have small amount of food, more thirsty. Just got some protein powder to have when breaking fast.    Water: estimates about 32 oz water per day    Exercise/Activity: goes to gym usually 5 days per week - mix cardio and resistance training.     Medications Tried/Failed:  GLP1/GIP agonist : Patient denies personal or family history of MEN Type2, MTC, Pancreatitis.      Initial Consult Weight: 237 lb     Current weight today: 255 lbs 12.8 oz  Cumulative Weight Gain: + 18 lb, + 7 % from baseline    Wt Readings from Last 4 Encounters:   03/04/25 255 lb 12.8 oz (116 kg)   08/22/23 248 lb (112.5 kg)   05/14/16 190 lb (86.2 kg)   07/16/14 208 lb (94.3 kg)     Estimated body mass index is 37.78 kg/m  as calculated from the following:    Height as of this encounter: 5' 9\" (1.753 m).    Weight as of this encounter: 255 lb 12.8 oz (116 kg).        ADHD/ anxiety/depression :   Adderall XR 10 mg as needed daily for focus.    Patient not needing meds for anxiety/depression currently. Uses adderall 1-2 times per week or less. Works well for her. Denies side effects.    Supplements  Vitamin D 2000 international units once daily    Patient forgets to take this, but notes she wants to be healthy and focus on well being. Vitamin d when taking in the past helps with energy, so would like to restart. Requests refill to " [facility].            Today's Vitals: Ht 5' 9\" (1.753 m)   Wt 255 lb 12.8 oz (116 kg)   BMI 37.78 kg/m    ----------------      I spent 28 minutes with this patient today. All changes were made via collaborative practice agreement with Jacquelyn Evans.     A summary of these recommendations was sent via [employer].    Jasmina Chandra, Pharm D., MPH    Medication Therapy Management Pharmacist   [facility]      Telemedicine Visit Details  The patient's medications can be safely assessed via a telemedicine encounter.  Type of service:  Telephone visit  Originating Location (pt. Location): Home    Distant Location (provider location):  Off-site  Start Time:  7:33 AM  End Time: 8:01 AM     Medication Therapy Recommendations  Class 1 obesity without serious comorbidity with body mass index (BMI) of 31.0 to 31.9 in adult, unspecified obesity type   1 Current Medication: COMPOUNDED NON-CONTROLLED SUBSTANCE (CMPD RX) - PHARMACY TO MIX COMPOUNDED MEDICATION   Current Medication Sig: Compounded semaglutide 0.5 mg subcutaneous injection once weekly   Rationale: Dose too low - Dosage too low - Effectiveness   Recommendation: Increase Dose   Status: Accepted per CPA   Identified Date: 3/4/2025 Completed Date: 3/4/2025         2 Current Medication: COMPOUNDED NON-CONTROLLED SUBSTANCE (CMPD RX) - PHARMACY TO MIX COMPOUNDED MEDICATION   Current Medication Sig: Compounded semaglutide 0.5 mg subcutaneous injection once weekly   Rationale: Does not understand instructions - Adherence - Adherence   Recommendation: Provide Education   Status: Patient Agreed - Adherence/Education   Identified Date: 3/4/2025 Completed Date: 3/4/2025         Vitamin D deficiency   1 Current Medication: vitamin D3 (CHOLECALCIFEROL) 50 mcg (2000 units) tablet   Current Medication Sig: Take 1 tablet (50 mcg) by mouth daily.   Rationale: Patient forgets to take - Adherence - Adherence   Recommendation: Provide " Adherence Intervention   Status: Accepted per CPA   Identified Date: 3/4/2025 Completed Date: 3/4/2025

## 2025-03-04 NOTE — Clinical Note
Diana is tolerating compounded semaglutide well. Some weight gain, but suspect some may be to inconsistent dietary intake (Ramadan currently, somewhat low intake prior to Ramadan). So she will maintain compounded semaglutide 0.25 mg and work on increasing protein and water after sun down and then will increase to 0.5 mg given tolerating well after Ramadan.  I see her again in April to help with transition likely to compounded tirzepatide.  Jasmina

## 2025-04-22 ENCOUNTER — VIRTUAL VISIT (OUTPATIENT)
Dept: PHARMACY | Facility: CLINIC | Age: 35
End: 2025-04-22
Attending: INTERNAL MEDICINE
Payer: COMMERCIAL

## 2025-04-22 VITALS — BODY MASS INDEX: 37.71 KG/M2 | WEIGHT: 254.6 LBS | HEIGHT: 69 IN

## 2025-04-22 DIAGNOSIS — E66.812 OBESITY, CLASS II, BMI 35-39.9: Primary | ICD-10-CM

## 2025-04-22 ASSESSMENT — PAIN SCALES - GENERAL: PAINLEVEL_OUTOF10: NO PAIN (0)

## 2025-04-22 NOTE — PATIENT INSTRUCTIONS
"Recommendations from MTM Pharmacist visit:                                                    MTM (medication therapy management) is a service provided by a clinical pharmacist designed to help you get the most of out of your medicines.  You may be sent a phone or email survey evaluating today's visit.  Please provide feedback you have for the service he received today if you are able.      Start Zepbound 2.5 mg weekly injection from [facility] (see ordering and administration info below). You may try taking injection every 10 days to help with reducing cost and having doses last longer. If you find this is not as effective for appetite management, you may reduce to every 7 day dosing.    Zepbound Vials Through [facility]    Zepbound is now available as single use vials at the 2.5 mg, 5 mg, 7.5 mg and 10 mg only. The single-dose vials are only sold in packages of 4 vials and only offered at cash price from [facility]. The prescription for the vials will be sent to the pharmacy directly. There are no plans from the manufacture to offer higher dosing in vials. They will either email or text you when [facility] has obtained the prescription to start the process to mail the prescription to you. You need to answer the questions from the email or text to complete the mailing order. You will need to say \"yes\" to obtaining the administration supplies (needle/syringes and alcohol wipes) when purchasing the Zepbound vials from [facility] - they will ask you about this when you start your order with them.     Zepbound Vial Dosing   Initiate 2.5 mg subcutaneously once weekly for at least 4 weeks. Can further dose escalate to 5 mg once weekly for at least 4 weeks, then option to further increase to 7.5 mg once weekly for 4 weeks, then option to further increase to 10 mg once weekly.   When to escalate dosing is individualized for each patient and should be " "discussed between provider and patient. If you are feeling clinically effective response with little to no side effects, would recommend staying at the dose you are at.     Zepbound Vial Administration Video:   Go to the below link and select \"vial\" tab above video. Written instructions with pictures also available on website below video.   [email or web address]    Zepbound Storage and Stability:   Make sure that when you get the prescription that you store the Zepbound vials in the refrigerator (good until expiration date on vial under refrigerated temperature). Once it is time to do your injection, remove only the single-dose vial needed for your injection and keep other vials in the refrigerator until needed. Each single dose vial is good at room temperature for up to 21 days if necessary. If stored at room temperature, do not return to refrigerator. Discard vial if not used in 21 days after removing from refrigerator.     Zepbound Common Side Effects:   Nausea, diarrhea, constipation, headache, tiredness (fatigue), dizziness, stomach upset/pain. Less commonly, Zepbound can cause low blood sugar (symptoms: shaky, dizzy, sweaty, agitation). Please reach out to the care team should you feel like this is occurring. It is important to ensure that you are eating consistent meals and not skipping meals. Ensure you are getting at least 64 oz water daily.     Cost:   $349/4 single-dose 2.5 mg vials + $5 for administration supplies (4 needles/syringes, alcohol wipes)  $499/4 single-dose 5 mg vials + $5 for administration supplies (4 needles/syringes, alcohol wipes)  $499/4 single-dose 7.5 mg vials + $5 for administration supplies (4 needles/syringes, alcohol wipes) - so long as you fill every 45 day. If you fill outside of every 45 days, cost is $699 for 4 vials  $499/4 single-dose 10 mg vials + $5 for administration supplies (4 needles/syringes, alcohol wipes) - so long as you fill every 45 day. If you " "fill outside of every 45 days, cost is $699 for 4 vials    More Information:   [email or web address]     Follow-up:   Appointments in Next Year      Jun 24, 2025 12:45 PM  (Arrive by 12:30 PM)  Return Weight Management Visit with Jacquelyn Evans MD  [facility] [place] ([facility] [place] ) [phone]     Aug 25, 2025 7:30 AM  Pharmacist Visit with Jasmina Chandra RPH  [facility]TM ([facility] ) [phone]                It was great speaking with you today.  I value your experience and would be very thankful for your time in providing feedback in our clinic survey. In the next few days, you may receive an email or text message from [employer] with a link to a survey related to your  clinical pharmacist.\"     To schedule another MTM appointment, please call the clinic directly ([facility] Phone Number: [phone] (schedules for [facility] and [facility] - providers, dietitians, health coaches) or you may call the MTM scheduling line at [phone] or toll-free at [phone].     My Clinical Pharmacist's contact information:                                                      Please feel free to contact me with any questions or concerns you have.      Jasmina Chandra, Pharm D., MPH    Medication Therapy Management Pharmacist   [facility]          Meal Replacement Shake Options:   *Protein Shake Criteria: no more than 210 Calories, at least 20 grams of protein, and less than 10 grams of sugar   Premier Protein (160 Calories, 30 g protein)  Slim Fast Advanced Nutrition (180 Calories, 20 g protein)  Muscle Milk, lactose-free, 17 oz bottle (210 Calories, 30 g protein)  Integrated Supplements, no artificial sugars (110 Calories, 20 g protein)  Boost/Ensure Max (160 calories, " 30 gm protein)   [facility] Protein Shakes (160-230 calories, 26-42 gm protein)  Aldi's Elevation Protein Powder (180 calories, 30 gm protein)   Orgain Protein Shakes (130-160 calories, 20-26 gm protein)     Meal Replacement Bar Options:  Quest Protein Bars (190 Calories, 20 g protein)  Built Bar (170 Calories, 15-20 g protein)  One Protein Bar (210 calories, 20 g protein)  [place] Signature Protein Bar ([employer]) (190 Calories, 21 g protein)  Pure Protein Bars (180 Calories, 21 g protein)    Low Calorie Frozen Meal:  Healthy Choice Power Bowls  Lean Katty  Smart Ones  Jaylenbrandy Floodjanelle Gross      ---------------------------------------------------------------  Tips to Increase the Protein in Your Diet  You may need more protein in your diet to help you heal from an illness, surgery or wound. Extra protein can also help you gain weight. Here are some ideas for adding high-protein foods to your meals.  Meat and fish  Add chopped cooked meat to vegetables, salads, casseroles, soups, sauces and biscuit dough.  Use in omelets, soufflés, quiches, sandwich fillings and chicken or turkey stuffing.  Wrap in pie crust or biscuit dough to make a turnover.  Add to stuffed baked potatoes.  Make a dip with diced meat or flaked fish mixed with sour cream and spices.  Chopped, hard-cooked eggs  Add to salads.  Use for snacks and sandwich filling.  High-protein milk  To make high-protein milk, mix 1 quart whole milk with 1 cup powdered milk.  Add to cream soups, mashed potatoes, scrambled eggs, cereals and dried eggnog mix.  Use as an ingredient in puddings, custards, hot chocolate, milk shakes and pancakes.  Powdered milk  If you don't have any high-protein milk on hand, you can use powdered milk. Add 3 tablespoons to:  gravies, sauces, cream soups, mayonnaise  casseroles, meat patties, meatloaf, tuna salad, deviled ham  scalloped or mashed potatoes, creamed spinach  scrambled eggs, egg salad  cereals  yogurt, milk drinks, ice cream,  frozen desserts, puddings, custards.  Add 4 to 6 tablespoons powdered milk to make:  cream sauces  breads, muffins, pancakes, waffles, cookies, cakes  cream pies, frostings, cake fillings  fruit cobblers, bread or rice pudding, gelatin desserts.  For high-protein eggnog, add 3 to 6 tablespoons powdered milk to prepared eggnog.  Hard or soft cheese  Melt on sandwiches, breads, tortillas, hamburgers, hot dogs, other meats, vegetables, eggs and pies.  Grate into soups, chili, sauces, casseroles, vegetables, potatoes, rice, noodles or meatloaf.  Eat with toast or crackers, or melt for scot dip.  Cottage cheese or ricotta cheese  Mix with or scoop on top of fruits and vegetables.  Add to casseroles, lasagna, spaghetti, noodles and egg dishes (omelets, scrambled eggs, soufflés).  Use in gelatin, pudding-type desserts, cheesecake and pancake batter.  Use to stuff crepes, pasta shells or manicotti.  Fruit yogurt  Blend with fruits for a fruit smoothie.  Use as a dip for fruits and vegetables.  Scoop on top of pancakes or waffles.  Tofu  Blend silken tofu with fruits and juices for a smoothie.  Add chunks of firm tofu to soups and stews, or crumble into meatloaf.  Blend dried onion soup mix into soft or silken tofu for dip.  Use pureed silken tofu for part of the mayonnaise, sour cream, cream cheese or ricotta cheese called for in recipes.  Beans  Use cooked beans or peas in soups, casseroles, pasta, tacos and burritos.  Nuts and seeds  Note: For children under 3, discuss with the child's care team.  Use in casseroles, breads, muffins, pancakes, cookies and waffles.  Sprinkle on fruits, cereals, ice cream, yogurt, vegetables and salads.  Mix with raisins, dried fruits and chocolate chips for a snack.  Nut butters  Note: For children under 3, discuss with the child's care team.  Spread on sandwiches, toast, muffins, crackers, waffles, pancakes and fruit slices.  Use as a dip for raw vegetables.  Blend with milk drinks, or  "swirl through ice cream, yogurt or hot cereal.  Nutrition supplements (nutrition bars, drinks and powders)  Add powders to milk drinks and desserts.  Mix with ice cream, milk and fruit for a high-protein milk shake.    For informational purposes only. Not to replace the advice of your health care provider. Clinically reviewed by Monica Whittington, DON, LD, and the Clinical Nutrition Service Line. Copyright   2005 [facility]. All rights reserved. [employer] 786104 - REV 04/24.      -----------------------------------------------------------------------------------------------------------------  Learning About High-Protein Foods  What foods are high in protein?     The foods you eat contain nutrients, such as vitamins and minerals. Protein is a nutrient. Your body needs the right amount to stay healthy and work as it should. You can use the list below to help you make choices about which foods to eat.  Here are some examples of foods that are high in protein.  Dairy and dairy alternatives  Cheese  Milk  Soy milk  Yogurt (especially Greek)  Meat  Beef  Chicken  Ham  Lamb  Lunch meat  Pork  Sausage  Turkey  Other protein foods  Hemp seeds!  Beans (black, garbanzo, kidney, lima)  Eggs  Hummus  Lentils  Nuts  Peanut butter and other nut butters  Peas  Soybeans  Tofu  Veggie or soy jennifer (Check the nutrition label for the amount of protein in each serving.)  Seafood  Anchovies  Cod  Crab  Halibut  Robinson  Sardines  Shrimp  Tilapia  Sebewaing  Tuna  Protein supplements  Bars (Check the nutrition label for the amount of protein in each serving.)  Drinks  Powders  Work with your doctor to find out how much of this nutrient you need. Depending on your health, you may need more or less of it in your diet.  Where can you learn more?  Go to https://www.healthTreedom.net/patiented  Enter P335 in the search box to learn more about \"Learning About High-Protein Foods.\"  Current as of: September 20, 2023               " Content Version: 14.0    9966-7833 [employer].   Care instructions adapted under license by your healthcare professional. If you have questions about a medical condition or this instruction, always ask your healthcare professional. [employer] disclaims any warranty or liability for your use of this information.

## 2025-04-22 NOTE — Clinical Note
Diana decided to pursue Zepbound vials out of pocket given she was seeing some appetite benefit from compounded semaglutide and no longer available. She Is going to try 2.5mg as long as able to optimize cost.   She sees you in June and will follow up with me in Aug.  Jasmina

## 2025-04-22 NOTE — PROGRESS NOTES
Medication Therapy Management (MTM) Encounter    ASSESSMENT:                            Medication Adherence/Access: discussed GLP1/GIP Agonist coverage for weight management in 2025 after compounded semaglutide not available from [facility].     Discussed options for continuing GLP1/GIP agonist in 2025  out of pocket. Education provided on extending dosing interval to optimize cost. Patient elected to pursue Zepbound vial (see further discussion below)    Weight management :   Patient would benefit from continuing pharmacotherapy for weight management. Given tolerating semaglutide well, class II obesity, recommend optimizing GLP1/GIP therapy as data to support most significant weight loss and patient has no contraindications. Patient also realizing benefit from reduction in food noise and increased satiety. Despite this, having some weight gain - recommend transition to Zepbound given more potent and similar cost to [facility] out of pocket without insurance coverage. May optimize lowest, least expensive, pre-clinical Zepbound dose additionally to optimize cost and weight loss benefit.  Education provided on continued dietary and behavioral modifications    Completed teaching of administration of Zepbound vial . Discussed mechanism of action, side effects, warnings, and efficacy. Discussed appropriate storage and stability. Answered patient questions.     PLAN:                            Start Zepbound 2.5 mg weekly injection from [facility] (see ordering and administration info below). You may try taking injection every 10 days to help with reducing cost and having doses last longer. If you find this is not as effective for appetite management, you may reduce to every 7 day dosing.    Zepbound Vials Through [facility]    Zepbound is now available as single use vials at the 2.5 mg, 5 mg, 7.5 mg and 10 mg only. The single-dose vials are only sold in packages of 4  "vials and only offered at cash price from [facility]. The prescription for the vials will be sent to the pharmacy directly. There are no plans from the manufacture to offer higher dosing in vials. They will either email or text you when [facility] has obtained the prescription to start the process to mail the prescription to you. You need to answer the questions from the email or text to complete the mailing order. You will need to say \"yes\" to obtaining the administration supplies (needle/syringes and alcohol wipes) when purchasing the Zepbound vials from [facility] - they will ask you about this when you start your order with them.     Zepbound Vial Dosing   Initiate 2.5 mg subcutaneously once weekly for at least 4 weeks. Can further dose escalate to 5 mg once weekly for at least 4 weeks, then option to further increase to 7.5 mg once weekly for 4 weeks, then option to further increase to 10 mg once weekly.   When to escalate dosing is individualized for each patient and should be discussed between provider and patient. If you are feeling clinically effective response with little to no side effects, would recommend staying at the dose you are at.     Zepbound Vial Administration Video:   Go to the below link and select \"vial\" tab above video. Written instructions with pictures also available on website below video.   [email or web address]    Zepbound Storage and Stability:   Make sure that when you get the prescription that you store the Zepbound vials in the refrigerator (good until expiration date on vial under refrigerated temperature). Once it is time to do your injection, remove only the single-dose vial needed for your injection and keep other vials in the refrigerator until needed. Each single dose vial is good at room temperature for up to 21 days if necessary. If stored at room temperature, do not return to refrigerator. Discard vial if not used in 21 days " after removing from refrigerator.     Zepbound Common Side Effects:   Nausea, diarrhea, constipation, headache, tiredness (fatigue), dizziness, stomach upset/pain. Less commonly, Zepbound can cause low blood sugar (symptoms: shaky, dizzy, sweaty, agitation). Please reach out to the care team should you feel like this is occurring. It is important to ensure that you are eating consistent meals and not skipping meals. Ensure you are getting at least 64 oz water daily.     Cost:   $349/4 single-dose 2.5 mg vials + $5 for administration supplies (4 needles/syringes, alcohol wipes)  $499/4 single-dose 5 mg vials + $5 for administration supplies (4 needles/syringes, alcohol wipes)  $499/4 single-dose 7.5 mg vials + $5 for administration supplies (4 needles/syringes, alcohol wipes) - so long as you fill every 45 day. If you fill outside of every 45 days, cost is $699 for 4 vials  $499/4 single-dose 10 mg vials + $5 for administration supplies (4 needles/syringes, alcohol wipes) - so long as you fill every 45 day. If you fill outside of every 45 days, cost is $699 for 4 vials    More Information:   [email or web address]     Follow-up:   Appointments in Next Year      Jun 24, 2025 12:45 PM  (Arrive by 12:30 PM)  Return Weight Management Visit with Jacquelyn Evans MD  [facility] [place] ([facility] [place] ) [phone]     Aug 25, 2025 7:30 AM  Pharmacist Visit with Jasmina Chandra RPH  [facility] ([facility] [place] ) [phone]            SUBJECTIVE/OBJECTIVE:                          Diana Nelson is a 34 year old female seen for a follow-up visit.       Reason for visit: Medication Therapy Management - GLP1/GIP Agonist Management     Allergies/ADRs: Reviewed in chart  Past Medical History: Reviewed in chart  Tobacco: She reports that she has never smoked. She has never used  "smokeless tobacco.  Alcohol: not currently using    Medication Adherence/Access: Medication barriers: obtaining medication from insurance. Paying out of pocket for GLP1/GIP agonist and would like to continue in most affordable way possible.    Weight Management   Compounded semaglutide  0.5 mg  weekly  - has exhausted supply  Adderall XR 10 mg once daily (attention- rarely for work days that are very busy)    Return Medical Weight Management Visit 1/7/25 with Dr. Jacquelyn Evans    Patient reports no current medication side effects. Somewhat frustrated that seeing weight gain with semaglutide, but notes Ramadan may be a bit different for weight management for her given only eating after sundown - recently completed. Notes lower portions which is helpful. Does feel that Adderall decreases appetite, but prefers to limit use.    Notes appetite is better WITH semaglutide than without. Notes sweets cravings and portions (hard to manage in the past) - better managed with GLP1/GIP Agonist. Would like to continue  this.    Nutrition/Eating Habits: limiting portions and focusing on protein.  2 -3 meals per day with protein focus - and will add a protein shake most days.    Water: estimates about 32 oz water per day; working toward getting 64 oz per day    Exercise/Activity: goes to gym usually 5 days per week - mix cardio and resistance training.     Medications Tried/Failed:  GLP1/GIP agonist : Patient denies personal or family history of MEN Type2, MTC, Pancreatitis.      Initial Consult Weight: 237 lb         Current weight today: 254 lbs 9.6 oz  Cumulative Weight Gain: +17 lb, +7% from baseline    Wt Readings from Last 4 Encounters:   04/22/25 254 lb 9.6 oz (115.5 kg)   03/04/25 255 lb 12.8 oz (116 kg)   08/22/23 248 lb (112.5 kg)   05/14/16 190 lb (86.2 kg)     Estimated body mass index is 37.5 kg/m  as calculated from the following:    Height as of this encounter: 5' 9.09\" (1.755 m).    Weight as of this " "encounter: 254 lb 9.6 oz (115.5 kg).        Today's Vitals: Ht 5' 9.09\" (1.755 m)   Wt 254 lb 9.6 oz (115.5 kg)   BMI 37.50 kg/m    ----------------  Post Discharge Medication Reconciliation Status: discharge medications reconciled, continue medications without change.     I spent 26 minutes with this patient today. All changes were made via collaborative practice agreement with Jacquelyn Evans.     A summary of these recommendations was sent via [employer].    Jasmina Chandra, Pharm D., MPH    Medication Therapy Management Pharmacist   [facility]     Telemedicine Visit Details  The patient's medications can be safely assessed via a telemedicine encounter.  Type of service:  Telephone visit  Originating Location (pt. Location): Home    Distant Location (provider location):  Off-site  Start Time:  8:35 AM  End Time: 9:01 AM     Medication Therapy Recommendations  Obesity, Class II, BMI 35-39.9   1 Current Medication: COMPOUNDED NON-CONTROLLED SUBSTANCE (CMPD RX) - PHARMACY TO MIX COMPOUNDED MEDICATION (Discontinued)   Current Medication Sig: Compounded semaglutide 0.5 mg subcutaneous injection once weekly   Rationale: Medication product not available - Adherence - Adherence   Recommendation: Change Medication   Status: Accepted per CPA   Identified Date: 4/22/2025 Completed Date: 4/22/2025            "

## 2025-04-22 NOTE — NURSING NOTE
Current patient location:  [place], [place]    Is the patient currently in the state of [place]? YES    Visit mode:telephone    If the visit is dropped, the patient can be reconnected by:  cell phone:   Telephone Information:   Mobile [phone]       Will anyone else be joining the visit? NO  (If patient encounters technical issues they should call [phone]    Are changes needed to the allergy or medication list? Pt stated no changes to allergies and Pt stated no med changes    Are refills needed on medications prescribed by this physician? YES compund    Reason for visit: Medication Therapy Management    Nanci Wooe VVF